# Patient Record
Sex: MALE | Race: WHITE | NOT HISPANIC OR LATINO | URBAN - METROPOLITAN AREA
[De-identification: names, ages, dates, MRNs, and addresses within clinical notes are randomized per-mention and may not be internally consistent; named-entity substitution may affect disease eponyms.]

---

## 2017-06-18 ENCOUNTER — INPATIENT (INPATIENT)
Facility: HOSPITAL | Age: 42
LOS: 0 days | Discharge: HOME | End: 2017-06-19
Attending: EMERGENCY MEDICINE

## 2017-06-18 DIAGNOSIS — R55 SYNCOPE AND COLLAPSE: ICD-10-CM

## 2017-06-18 DIAGNOSIS — R20.2 PARESTHESIA OF SKIN: ICD-10-CM

## 2017-06-18 DIAGNOSIS — F17.210 NICOTINE DEPENDENCE, CIGARETTES, UNCOMPLICATED: ICD-10-CM

## 2017-06-18 DIAGNOSIS — M54.9 DORSALGIA, UNSPECIFIED: ICD-10-CM
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
## 2017-06-18 DIAGNOSIS — S01.21XA LACERATION WITHOUT FOREIGN BODY OF NOSE, INITIAL ENCOUNTER: ICD-10-CM

## 2017-06-18 DIAGNOSIS — S00.83XA CONTUSION OF OTHER PART OF HEAD, INITIAL ENCOUNTER: ICD-10-CM

## 2018-08-29 ENCOUNTER — EMERGENCY (EMERGENCY)
Facility: HOSPITAL | Age: 43
LOS: 0 days | Discharge: HOME | End: 2018-08-29
Attending: EMERGENCY MEDICINE | Admitting: EMERGENCY MEDICINE

## 2018-08-29 VITALS
HEART RATE: 87 BPM | DIASTOLIC BLOOD PRESSURE: 72 MMHG | TEMPERATURE: 98 F | OXYGEN SATURATION: 98 % | SYSTOLIC BLOOD PRESSURE: 120 MMHG | RESPIRATION RATE: 20 BRPM

## 2018-08-29 VITALS — HEIGHT: 70 IN | WEIGHT: 202.83 LBS

## 2018-08-29 DIAGNOSIS — M25.572 PAIN IN LEFT ANKLE AND JOINTS OF LEFT FOOT: ICD-10-CM
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
## 2018-08-29 DIAGNOSIS — M25.579 PAIN IN UNSPECIFIED ANKLE AND JOINTS OF UNSPECIFIED FOOT: ICD-10-CM
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
## 2018-08-29 RX ORDER — IBUPROFEN 200 MG
600 TABLET ORAL ONCE
Qty: 0 | Refills: 0 | Status: COMPLETED | OUTPATIENT
Start: 2018-08-29 | End: 2018-08-29

## 2018-08-29 RX ADMIN — Medication 600 MILLIGRAM(S): at 02:48

## 2018-08-29 RX ADMIN — Medication 600 MILLIGRAM(S): at 03:30

## 2018-08-29 NOTE — ED PROCEDURE NOTE - ATTENDING CONTRIBUTION TO CARE
I was present for and supervised the key/critical aspects of the procedures performed during the care of the patient---splint application and crutches.

## 2018-08-29 NOTE — ED PROVIDER NOTE - OBJECTIVE STATEMENT
44 yo M with no significant PMHx presents to the ED c/o left ankle pain that started earlier today. Pt was getting off trailer and fell landing on left foot and twisted ankle. Pt took Tylenol earlier with minimal improvement in pain. Pt denies fever, chills, numbness, weakness.

## 2018-08-29 NOTE — ED ADULT NURSE NOTE - NSIMPLEMENTINTERV_GEN_ALL_ED
Implemented All Universal Safety Interventions:  [place] to call system. Call bell, personal items and telephone within reach. Instruct patient to call for assistance. Room bathroom lighting operational. Non-slip footwear when patient is off stretcher. Physically safe environment: no spills, clutter or unnecessary equipment. Stretcher in lowest position, wheels locked, appropriate side rails in place.

## 2018-08-29 NOTE — ED PROVIDER NOTE - ATTENDING CONTRIBUTION TO CARE
44 yo male with no significant PMH presents to the ER for left ankle pain that began today after a twisting and fall earlier today. Pt was getting off a trailer (non moving) and slipped. Fell and landed awkwardly to the left foot ankle with it twisting as it landed on ground. No other injury. No pain to hip/knee, no LOC. No head injury. On exam normal ROM, no hip tenderness, no knee tenderness, +ankle tenderness on palpation to lateral malleolus more than medial malleolus, with no joint deformity and is neurovascularly intact. Skin intact. No other injury on exam. To check xray, splint/crutches  and ortho follow up.

## 2018-08-29 NOTE — ED PROVIDER NOTE - MEDICAL DECISION MAKING DETAILS
patient presents with a clinical ankle sprain. Xray to be done, NSAIDs, Splint and crutches to be done and ortho follow up as an outpatient.

## 2018-08-29 NOTE — ED PROVIDER NOTE - PHYSICAL EXAMINATION
VITAL SIGNS: I have reviewed nursing notes and confirm.  CONSTITUTIONAL: Well-developed; well-nourished; in no acute distress.  SKIN: Skin exam is warm and dry, no acute rash.  HEAD: Normocephalic; atraumatic.  EYES: Conjunctiva and sclera clear.  NECK: Supple; non tender.  EXT: Normal ROM. No clubbing, cyanosis or edema. (+) mild TTP to left lateral malleolus with swelling. No ecchymosis, erythema, or deformity. DP 2+. FROM.   NEURO: Alert, oriented. Grossly unremarkable. No focal deficits.

## 2020-03-17 NOTE — ED ADULT NURSE NOTE - NSSISCREENINGQ4_ED_A_ED
----- Message from Radha Medrano sent at 3/17/2020 12:11 PM CDT -----  Contact: Pt   Pt is requesting a call back regarding labs that was supposed to be scheduled for 03/18/2020     Pt can be reached at [phone]      No

## 2022-12-02 NOTE — ED PROCEDURE NOTE - CPROC ED TOLERANCE1
Patient tolerated procedure well. DISPLAY PLAN FREE TEXT DISPLAY PLAN FREE TEXT DISPLAY PLAN FREE TEXT